# Patient Record
Sex: FEMALE | URBAN - METROPOLITAN AREA
[De-identification: names, ages, dates, MRNs, and addresses within clinical notes are randomized per-mention and may not be internally consistent; named-entity substitution may affect disease eponyms.]

---

## 2022-05-16 ENCOUNTER — OFFICE VISIT (OUTPATIENT)
Dept: URBAN - METROPOLITAN AREA CLINIC 47 | Facility: CLINIC | Age: 57
End: 2022-05-16
Payer: COMMERCIAL

## 2022-05-16 DIAGNOSIS — E11.3513 TYPE 2 DIABETES MELLITUS WITH PROLIFERATIVE DIABETIC RETINOPATHY WITH MACULAR EDEMA, BILATERAL: ICD-10-CM

## 2022-05-16 DIAGNOSIS — E11.3512 TYPE 2 DIABETES MELLITUS W/ PROLIFERATIVE DIABETIC RETINOPATHY W/ MACULAR EDEMA OF LEFT EYE: ICD-10-CM

## 2022-05-16 DIAGNOSIS — E11.3411 TYPE 2 DIABETES MELLITUS W/ SEVERE NONPROLIFERATIVE DIABETIC RETINOPATHY W/ MACULAR EDEMA OF RIGHT EYE: ICD-10-CM

## 2022-05-16 PROCEDURE — 99205 OFFICE O/P NEW HI 60 MIN: CPT | Performed by: OPHTHALMOLOGY

## 2022-05-16 PROCEDURE — 92134 CPTRZ OPH DX IMG PST SGM RTA: CPT | Performed by: OPHTHALMOLOGY

## 2022-05-16 ASSESSMENT — INTRAOCULAR PRESSURE
OS: 15
OD: 20

## 2022-05-16 NOTE — IMPRESSION/PLAN
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 Plan: She has TRD along the arcades OS and DME OU. OCT shows CME OU and ERM OS. We discussed the findings and natural history. I recommend surgery to reduce the risk of vision loss. WE discussed the R/IB/A in detail and all questions answered. She understands the goal of treatment is to reduce vision loss and she understands I cannot guarantee vision improvement. I recommend pre-op avastin but we cannot treat today due to her insurance. thanks Richard WILKES 1-2 weeks avastin OU; then PLAN: 25g PPV/MP/ICG/ILM PEEL OS with pre-op avastin OU

## 2022-05-16 NOTE — IMPRESSION/PLAN
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  Plan: see above

## 2022-05-16 NOTE — IMPRESSION/PLAN
Impression: Type 2 diabetes mellitus w/ proliferative diabetic retinopathy w/ macular edema of left eye: E11.3512.  Plan: see above

## 2022-05-24 ENCOUNTER — PROCEDURE (OUTPATIENT)
Dept: URBAN - METROPOLITAN AREA CLINIC 49 | Facility: CLINIC | Age: 57
End: 2022-05-24
Payer: COMMERCIAL

## 2022-05-24 ASSESSMENT — INTRAOCULAR PRESSURE
OS: 15
OD: 15

## 2022-05-27 ENCOUNTER — SURGERY (OUTPATIENT)
Dept: URBAN - METROPOLITAN AREA EXTERNAL CLINIC 26 | Facility: EXTERNAL CLINIC | Age: 57
End: 2022-05-27
Payer: COMMERCIAL

## 2022-05-27 PROCEDURE — 67113 REPAIR RETINAL DETACH CPLX: CPT | Performed by: OPHTHALMOLOGY

## 2022-05-28 ENCOUNTER — POST-OPERATIVE VISIT (OUTPATIENT)
Dept: URBAN - METROPOLITAN AREA CLINIC 7 | Facility: CLINIC | Age: 57
End: 2022-05-28
Payer: COMMERCIAL

## 2022-05-28 DIAGNOSIS — E11.3522 TYPE 2 DIABETES MELLITUS WITH PROLIFERATIVE DIABETIC RETINOPATHY WITH TRACTION RETINAL DETACHMENT INVOLVING THE MACULA OF LEFT EYE: Primary | ICD-10-CM

## 2022-05-28 PROCEDURE — 99024 POSTOP FOLLOW-UP VISIT: CPT | Performed by: OPHTHALMOLOGY

## 2022-05-28 RX ORDER — BRIMONIDINE TARTRATE 2 MG/ML
0.2 % SOLUTION/ DROPS OPHTHALMIC
Qty: 5 | Refills: 1 | Status: INACTIVE
Start: 2022-05-28 | End: 2022-06-26

## 2022-05-28 ASSESSMENT — INTRAOCULAR PRESSURE
OD: 15
OS: 25

## 2022-05-28 NOTE — IMPRESSION/PLAN
Impression: S/P 25g PPV/MP/ICG/ILM PEEL OS OS - 1 Day. 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 Plan: No s/s of RD/infection [facility] acceptable IOP borderline - start alphagan BID Post-operative instructions and precautions Reviewed. Gas pos/prec. 
Call ASAP with changes
--Continue Ocuflox QID--Continue PF QID

1 week POS

## 2022-06-06 ENCOUNTER — POST-OPERATIVE VISIT (OUTPATIENT)
Dept: URBAN - METROPOLITAN AREA CLINIC 47 | Facility: CLINIC | Age: 57
End: 2022-06-06
Payer: COMMERCIAL

## 2022-06-06 DIAGNOSIS — E11.3512 TYPE 2 DIABETES MELLITUS W/ PROLIFERATIVE DIABETIC RETINOPATHY W/ MACULAR EDEMA OF LEFT EYE: Primary | ICD-10-CM

## 2022-06-06 PROCEDURE — 99024 POSTOP FOLLOW-UP VISIT: CPT | Performed by: OPHTHALMOLOGY

## 2022-06-06 NOTE — IMPRESSION/PLAN
Impression: S/P 25g PPV, MP, ICG, ILM peel OS - 10 Days. Type 2 diabetes mellitus w/ proliferative diabetic retinopathy w/ macular edema of left eye  E11.3512. Plan: Avoid Supine Alt. Prec.
taper off PF 
add alphagan RTC 1 month OCT OU; poss avastin OU; POS --Taper Prednisolone acetate 1% TID x 1 wk, BID x 1wk, QD x 1wk, then d/c--Discontinue Ocuflox--Discontinue Ofloxacin 0.3%

## 2022-07-18 ENCOUNTER — Encounter (OUTPATIENT)
Dept: URBAN - METROPOLITAN AREA CLINIC 47 | Facility: CLINIC | Age: 57
End: 2022-07-18

## 2022-08-15 ENCOUNTER — POST-OPERATIVE VISIT (OUTPATIENT)
Dept: URBAN - METROPOLITAN AREA CLINIC 47 | Facility: CLINIC | Age: 57
End: 2022-08-15
Payer: COMMERCIAL

## 2022-08-15 DIAGNOSIS — E11.3512 TYPE 2 DIABETES MELLITUS W/ PROLIFERATIVE DIABETIC RETINOPATHY W/ MACULAR EDEMA OF LEFT EYE: Primary | ICD-10-CM

## 2022-08-15 PROCEDURE — 92134 CPTRZ OPH DX IMG PST SGM RTA: CPT | Performed by: OPHTHALMOLOGY

## 2022-08-15 PROCEDURE — 99024 POSTOP FOLLOW-UP VISIT: CPT | Performed by: OPHTHALMOLOGY

## 2022-08-15 ASSESSMENT — INTRAOCULAR PRESSURE
OS: 13
OD: 17

## 2022-08-15 NOTE — IMPRESSION/PLAN
Impression: S/P 25g PPV, MP, ICG, ILM peel OS - 80 Days. Type 2 diabetes mellitus w/ proliferative diabetic retinopathy w/ macular edema of left eye  E11.3512. Plan: doing well OCT shows no CME OU
observe
she will f/u with your excellent care for CE/IOL
thanks Richard
RTC PRN